# Patient Record
Sex: MALE | Race: WHITE | URBAN - NONMETROPOLITAN AREA
[De-identification: names, ages, dates, MRNs, and addresses within clinical notes are randomized per-mention and may not be internally consistent; named-entity substitution may affect disease eponyms.]

---

## 2019-08-28 NOTE — PATIENT DISCUSSION
Recommend Mini lower lift, - platysmaplasty, pre/post-tragel, SMAS to chin/cheeks(discussed risks and benefits of sx. .. ).

## 2020-08-17 ENCOUNTER — IMPORTED ENCOUNTER (OUTPATIENT)
Dept: URBAN - NONMETROPOLITAN AREA CLINIC 1 | Facility: CLINIC | Age: 50
End: 2020-08-17

## 2020-08-17 PROBLEM — H52.4: Noted: 2020-08-17

## 2020-08-17 PROBLEM — H52.03: Noted: 2020-08-17

## 2020-08-17 PROCEDURE — S0620 ROUTINE OPHTHALMOLOGICAL EXA: HCPCS

## 2020-08-17 NOTE — PATIENT DISCUSSION
Hyperopia-Discussed diagnosis with patient. Presbyopia-Discussed diagnosis with patient. Updated spec Rx given. Recommend lens that will provide comfort as well as protect safety and health of eyes. S/P FAILED CORNEAL GRAFT OS

## 2020-08-20 NOTE — PATIENT DISCUSSION
This visual field clearly demonstrated a minimum of 36% loss of upper field of vision OU, with upper lid skin in repose and elevated by taping of the lid to demonstrate potential correction. This field shows that taping the lids significantly improved this patient's superior field of vision by approximately 36%, OU.

## 2022-04-09 ASSESSMENT — TONOMETRY: OD_IOP_MMHG: 15

## 2022-04-09 ASSESSMENT — VISUAL ACUITY
OD_CC: 20/20
OU_SC: J2